# Patient Record
Sex: MALE | Race: WHITE | URBAN - METROPOLITAN AREA
[De-identification: names, ages, dates, MRNs, and addresses within clinical notes are randomized per-mention and may not be internally consistent; named-entity substitution may affect disease eponyms.]

---

## 2018-03-14 ENCOUNTER — OFFICE VISIT (OUTPATIENT)
Dept: ORTHOPEDIC SURGERY | Age: 33
End: 2018-03-14

## 2018-03-14 DIAGNOSIS — M79.605 LEG PAIN, LEFT: Primary | ICD-10-CM

## 2018-03-14 PROCEDURE — 99213 OFFICE O/P EST LOW 20 MIN: CPT | Performed by: PHYSICIAN ASSISTANT

## 2018-03-14 NOTE — PROGRESS NOTES
Subjective:       Edwar Current is a 34 y.o. male No ref. provider found   for evaluation and treatment of an injury to the left leg  . This is evaluated as a personal injury. The injury occurred 1 hour ago. Pain is localized to the posterior of their left leg. He has not injured this area in the past.    Mechanism: Pt was playing basketball and felt a pop in the achilles tendon area when he made a cut. No prior injuries. Not able to bear weight right now. Noticed some swelling. No pain in the knee. Outside reports reviewed: none. Patient's medications, allergies, past medical, surgical, social and family histories were reviewed and updated as appropriate. The pain assessment was noted & is as follows:  Pain Assessment  Location of Pain: Leg  Location Modifiers: Left  Severity of Pain: 6  Quality of Pain: Sharp  Frequency of Pain: Constant  Aggravating Factors: Bending, Exercise, Standing  Limiting Behavior: Yes  Relieving Factors: Ice  Result of Injury: Yes    Physical Exam:  Constitutional:  Pt well groomed, no acute distress, well developed, no obvious deformities  There were no vitals filed for this visit.  -Oriented to person, place, and time  -Mood and affect is appropriate    CONSTITUTIONAL:  awake, alert, cooperative, no apparent distress, and appears stated age  MUSCULOSKELETAL:  there is no redness, warmth, or swelling of the joints  full range of motion noted  motor strength is 5 out of 5 all extremities bilaterally  tone is normal  with exception of  LEFT ANKLE:  redness absent  warmth absent  swelling present  tenderness present and located distal potion of the gastroc and some into the achilles tendon. No defect noted. Sensation intact. Negative Andrew's test.  Negative Stanton test.  Able to actively dorsiflex and plantarflex foot with some pain. No deformity. range of motion limited as above.     Contralateral Exam:  -No obvious deformities  -No abrasions or cellulitis noted, NVI

## 2018-03-26 ENCOUNTER — OFFICE VISIT (OUTPATIENT)
Dept: ORTHOPEDIC SURGERY | Age: 33
End: 2018-03-26

## 2018-03-26 VITALS
HEIGHT: 71 IN | SYSTOLIC BLOOD PRESSURE: 141 MMHG | WEIGHT: 229.06 LBS | DIASTOLIC BLOOD PRESSURE: 100 MMHG | HEART RATE: 109 BPM | BODY MASS INDEX: 32.07 KG/M2

## 2018-03-26 DIAGNOSIS — M79.605 LEFT LEG PAIN: ICD-10-CM

## 2018-03-26 DIAGNOSIS — S86.112A STRAIN OF GASTROCNEMIUS MUSCLE OF LEFT LOWER EXTREMITY, INITIAL ENCOUNTER: Primary | ICD-10-CM

## 2018-03-26 PROCEDURE — MISCD114 CALF SLEEVES: Performed by: FAMILY MEDICINE

## 2018-03-26 PROCEDURE — 99214 OFFICE O/P EST MOD 30 MIN: CPT | Performed by: FAMILY MEDICINE

## 2018-03-26 RX ORDER — NAPROXEN 500 MG/1
500 TABLET ORAL 2 TIMES DAILY WITH MEALS
Qty: 60 TABLET | Refills: 3 | Status: SHIPPED | OUTPATIENT
Start: 2018-03-26

## 2018-03-26 NOTE — PROGRESS NOTES
none.    Patient's medications, allergies, past medical, surgical, social and family histories were reviewed 3/14/2018 and updated as appropriate. Review of systems reviewed in the chart and updated 3/26/2018. The pain assessment was noted & is as follows:  Pain Assessment  Location of Pain: Leg  Location Modifiers: Left  Severity of Pain: 0    Constitutional: Patient is adequately groomed with no evidence of malnutrition  DTRs: Deep tendon reflexes are intact  Mental Status: The patient is oriented to time, place and person. The patient's mood and affect are appropriate. Lymphatic: The lymphatic examination bilaterally reveals all areas to be without enlargement or induration. Vascular: Examination reveals no swelling or calf tenderness. Peripheral pulses are palpable and 2+. Physical Exam:  Constitutional:  Pt well groomed, no acute distress, well developed, no obvious deformities  Vitals:    03/26/18 0843 03/26/18 0849   BP: (!) 148/101 (!) 141/100   Pulse: 104 109   Weight: 229 lb 0.9 oz (103.9 kg)    Height: 5' 10.87\" (1.8 m)      -Oriented to person, place, and time  -Mood and affect is appropriate    CONSTITUTIONAL:  awake, alert, cooperative, no apparent distress, and appears stated age  MUSCULOSKELETAL:  there is no redness, warmth, or swelling of the joints  full range of motion noted  motor strength is 5 out of 5 all extremities bilaterally  tone is normal  with exception of  LEFT ANKLE:  redness absent  warmth absent  swelling present  He does have localized tenderness at about 5-6 over the medial head musculotendinous junction with plus degrees of pain over the distal Achilles tendon. No osseous tenderness. No defect noted. Sensation intact. Negative Andrew's test.  Negative Stanton test.  Able to actively dorsiflex and plantarflex foot with some pain. No deformity. range of motion limited as above.     Contralateral Exam:  -No obvious deformities  -No abrasions or cellulitis

## 2018-03-27 ENCOUNTER — HOSPITAL ENCOUNTER (OUTPATIENT)
Dept: PHYSICAL THERAPY | Facility: MEDICAL CENTER | Age: 33
Discharge: HOME | End: 2018-03-31
Admitting: FAMILY MEDICINE

## 2018-03-29 DIAGNOSIS — S86.112A STRAIN OF GASTROCNEMIUS MUSCLE OF LEFT LOWER EXTREMITY, INITIAL ENCOUNTER: Primary | ICD-10-CM

## 2018-03-29 DIAGNOSIS — M79.605 LEFT LEG PAIN: ICD-10-CM

## 2018-03-30 ENCOUNTER — HOSPITAL ENCOUNTER (OUTPATIENT)
Dept: PHYSICAL THERAPY | Facility: MEDICAL CENTER | Age: 33
Discharge: HOME | End: 2018-03-31
Admitting: FAMILY MEDICINE

## 2018-04-01 ENCOUNTER — HOSPITAL ENCOUNTER (OUTPATIENT)
Dept: OTHER | Age: 33
Discharge: HOME | End: 2018-04-30
Attending: FAMILY MEDICINE | Admitting: FAMILY MEDICINE

## 2018-04-03 ENCOUNTER — HOSPITAL ENCOUNTER (OUTPATIENT)
Dept: PHYSICAL THERAPY | Facility: MEDICAL CENTER | Age: 33
Discharge: HOME | End: 2018-04-04
Admitting: FAMILY MEDICINE

## 2018-04-05 NOTE — FLOWSHEET NOTE
patients Functional Deficits. - Patient will demonstrate an increase in Strength to full and painfree to resistance testing to allow for proper functional mobility as indicated by patients Functional Deficits. - Patient will return to desired, higher level,  functional activities without increased symptoms or restriction. Progression Towards Functional goals:  [x] Patient is progressing as expected towards functional goals listed. [] Progression is slowed due to complexities listed. [] Progression has been slowed due to co-morbidities.   [] Plan just implemented, too soon to assess goals progression  [] Other:     ASSESSMENT:  See eval    Treatment/Activity Tolerance:  [] Patient tolerated treatment well [] Patient limited by fatique  [] Patient limited by pain  [] Patient limited by other medical complications  [] Other:     Prognosis: [] Good [] Fair  [] Poor    Patient Requires Follow-up: [x] Yes  [] No    PLAN:   [x] Continue per plan of care [] Alter current plan (see comments)  [] Plan of care initiated [] Hold pending MD visit [] Discharge    Electronically signed by: Porfirio Shaffer PT, MPT

## 2018-04-06 ENCOUNTER — HOSPITAL ENCOUNTER (OUTPATIENT)
Dept: PHYSICAL THERAPY | Facility: MEDICAL CENTER | Age: 33
Discharge: HOME | End: 2018-04-07
Admitting: FAMILY MEDICINE

## 2018-04-10 ENCOUNTER — HOSPITAL ENCOUNTER (OUTPATIENT)
Dept: PHYSICAL THERAPY | Facility: MEDICAL CENTER | Age: 33
Discharge: HOME | End: 2018-04-11
Admitting: FAMILY MEDICINE

## 2018-04-11 NOTE — FLOWSHEET NOTE
(3 positions)   30x each     Soleus matrix - 3 positions   30x each    Leg press (single leg)   80# - 45x    lsd   6\" - 30x    sls foam  3 x 20\"    Single leg 'lean'   10 x 4 positions    biodex RC   4'     clamwalks  2 laps blue    leandra: abd, ext   45x each  15#    bosu minisquats   20x 5\"     Seated soleus pumps  70# - 20x    'Gunner - patter' box             Manual Intervention  30\" x 2                Prom/stm   10'     Dry needling manual therapy: consisted on the placement of microfilament needles in the following muscles:  medial and lateral gastroc. A 50 mm needle was inserted, piston, rotated, and coned to produce intramuscular mobilization. These techniques were used to restore functional range of motion, reduce muscle spasm and induce healing in the corresponding musculature. (48684)  Clean Technique was utilized today while applying Dry needling treatment. The treatment sites where cleaned with 70% solution of  isopropyl alcohol . The PT washed their hands and utilized treatment gloves along with hand  prior to inserting the needles. All needles where removed and discarded in the appropriate sharps container. 10'                                                                                Therapeutic Exercise and NMR EXR  [x] (69620) Provided verbal/tactile cueing for activities related to strengthening, flexibility, endurance, ROM for improvements in LE, proximal hip, and core control with self care, mobility, lifting, ambulation.  [] (28570) Provided verbal/tactile cueing for activities related to improving balance, coordination, kinesthetic sense, posture, motor skill, proprioception  to assist with LE, proximal hip, and core control in self care, mobility, lifting, ambulation and eccentric single leg control.      NMR and Therapeutic Activities:    [x] (50954 or 72302) Provided verbal/tactile cueing for activities related to improving balance, coordination, kinesthetic sense,

## 2018-04-20 ENCOUNTER — HOSPITAL ENCOUNTER (OUTPATIENT)
Dept: PHYSICAL THERAPY | Facility: MEDICAL CENTER | Age: 33
Discharge: HOME | End: 2018-04-21
Admitting: FAMILY MEDICINE

## 2018-04-21 NOTE — FLOWSHEET NOTE
passive and active ROM to full, symmetrical and painfree to allow for proper joint functioning as indicated by patients Functional Deficits. - Patient will demonstrate an increase in Strength to full and painfree to resistance testing to allow for proper functional mobility as indicated by patients Functional Deficits. - Patient will return to desired, higher level,  functional activities without increased symptoms or restriction. Progression Towards Functional goals:  [x] Patient is progressing as expected towards functional goals listed. [] Progression is slowed due to complexities listed. [] Progression has been slowed due to co-morbidities.   [] Plan just implemented, too soon to assess goals progression  [] Other:     ASSESSMENT:  See eval    Treatment/Activity Tolerance:  [] Patient tolerated treatment well [] Patient limited by fatique  [] Patient limited by pain  [] Patient limited by other medical complications  [] Other:     Prognosis: [] Good [] Fair  [] Poor    Patient Requires Follow-up: [x] Yes  [] No    PLAN:   [x] Continue per plan of care [] Alter current plan (see comments)  [] Plan of care initiated [] Hold pending MD visit [] Discharge    Electronically signed by: Darroll Dandy PT, MPT

## 2018-04-24 ENCOUNTER — HOSPITAL ENCOUNTER (OUTPATIENT)
Dept: PHYSICAL THERAPY | Facility: MEDICAL CENTER | Age: 33
Discharge: HOME | End: 2018-04-25
Admitting: FAMILY MEDICINE

## 2018-04-25 ENCOUNTER — OFFICE VISIT (OUTPATIENT)
Dept: ORTHOPEDIC SURGERY | Age: 33
End: 2018-04-25

## 2018-04-25 VITALS — WEIGHT: 229.06 LBS | BODY MASS INDEX: 32.07 KG/M2 | HEIGHT: 71 IN

## 2018-04-25 DIAGNOSIS — M79.605 LEFT LEG PAIN: Primary | ICD-10-CM

## 2018-04-25 DIAGNOSIS — S86.112D STRAIN OF GASTROCNEMIUS MUSCLE OF LEFT LOWER EXTREMITY, SUBSEQUENT ENCOUNTER: ICD-10-CM

## 2018-04-25 PROCEDURE — 99213 OFFICE O/P EST LOW 20 MIN: CPT | Performed by: FAMILY MEDICINE

## 2018-04-26 NOTE — FLOWSHEET NOTE
[facility]  Orthopaedics and Sports Rehabilitation, [facility]    Physical Therapy Daily Treatment Note  Date:  2018  Patient Name:  Leonardo Julian    :  [birth year]  MRN: [id number]  Restrictions/Precautions:    Medical/Treatment Diagnosis Information:  · Diagnosis: S86.112A (ICD-10-CM) - Strain of gastrocnemius muscle of left lower extremity, initial encounter  · Treatment Diagnosis: M79.605 (ICD-10-CM) - Left leg pain  Insurance/Certification information:  PT Insurance Information: [facility]   Physician Information:  Referring Practitioner: dr Jen Barajas of care signed (Y/N):     Date of Patient follow up with Physician:     G-Code (if applicable):      Date G-Code Applied:    PT G-Codes  Functional Assessment Tool Used: lefs  Score: 34%  Functional Limitation: Mobility: Walking and moving around  Mobility: Walking and Moving Around Current Status (): At least 20 percent but less than 40 percent impaired, limited or restricted  Mobility: Walking and Moving Around Goal Status ():  At least 1 percent but less than 20 percent impaired, limited or restricted    Progress Note: []  Yes  [x]  No  Next due by: Visit #10       Latex Allergy:  [x]NO      []YES  Preferred Language for Healthcare:   [x]English       []other:    Visit # Insurance Allowable   7 018 PT FACILITY BENEFITS ANTHEM/ / OOP 2000/ 80% 20%/ 60 COMBINED VPCY/ NO AUTH REQ/ 3/27/18 SHAKIRA REF# [id number]     Pain level:  0-2/10     SUBJECTIVE:   Op note     OBJECTIVE:   Observation: op note   Test measurements:      RESTRICTIONS/PRECAUTIONS:   Exercises/Interventions:     Therapeutic Ex Sets/sec Reps Notes   Df belt stretch/inversion/eversion   6 x 30\"/4 x 20\"/4 x 20\"     Standing calf raises - 3 positions   45x each blue t band    Standing slantboard   4 x 30\"     Standing wall minisquats   20\" x 3 Single    Step and holds   10 x 10\"  Standing weight shifts (3 positions)   30x each     Soleus matrix - 3 positions   30x each    Leg press (single leg)   80# - 45x    lsd   6\" - 30x    sls foam  3 x 20\"    Single leg 'lean'   10 x 4 positions    biodex RC   4'     clamwalks  2 laps blue    leandra: abd, ext   45x each  15#    bosu minisquats   20x 5\"     Seated soleus pumps      Retro tmill   70# - 20x    12% - 4'     'Gunner - patter' box             Manual Intervention  30\" x 2                Prom/stm   10'     Dry needling manual therapy: consisted on the placement of microfilament needles in the following muscles:  medial and lateral gastroc. A 50 mm needle was inserted, piston, rotated, and coned to produce intramuscular mobilization. These techniques were used to restore functional range of motion, reduce muscle spasm and induce healing in the corresponding musculature. (02371)  Clean Technique was utilized today while applying Dry needling treatment. The treatment sites where cleaned with 70% solution of  isopropyl alcohol . The PT washed their hands and utilized treatment gloves along with hand  prior to inserting the needles. All needles where removed and discarded in the appropriate sharps container. Therapeutic Exercise and NMR EXR  [x] (19289) Provided verbal/tactile cueing for activities related to strengthening, flexibility, endurance, ROM for improvements in LE, proximal hip, and core control with self care, mobility, lifting, ambulation.  [] (96423) Provided verbal/tactile cueing for activities related to improving balance, coordination, kinesthetic sense, posture, motor skill, proprioception  to assist with LE, proximal hip, and core control in self care, mobility, lifting, ambulation and eccentric single leg control.      NMR and Therapeutic Activities:    [x] (88776 or 91677) Provided verbal/tactile cueing for activities related to improving balance, coordination, kinesthetic sense, posture, motor skill, proprioception and motor painfree to allow for proper joint functioning as indicated by patients Functional Deficits. - Patient will demonstrate an increase in Strength to full and painfree to resistance testing to allow for proper functional mobility as indicated by patients Functional Deficits. - Patient will return to desired, higher level,  functional activities without increased symptoms or restriction. Progression Towards Functional goals:  [x] Patient is progressing as expected towards functional goals listed. [] Progression is slowed due to complexities listed. [] Progression has been slowed due to co-morbidities.   [] Plan just implemented, too soon to assess goals progression  [] Other:     ASSESSMENT:  See eval    Treatment/Activity Tolerance:  [] Patient tolerated treatment well [] Patient limited by fatique  [] Patient limited by pain  [] Patient limited by other medical complications  [] Other:     Prognosis: [] Good [] Fair  [] Poor    Patient Requires Follow-up: [x] Yes  [] No    PLAN:   [x] Continue per plan of care [] Alter current plan (see comments)  [] Plan of care initiated [] Hold pending MD visit [] Discharge    Electronically signed by: Steven Ray PT, MPT

## 2018-04-27 ENCOUNTER — HOSPITAL ENCOUNTER (OUTPATIENT)
Dept: PHYSICAL THERAPY | Facility: MEDICAL CENTER | Age: 33
Discharge: HOME | End: 2018-04-28
Admitting: FAMILY MEDICINE

## 2018-04-30 NOTE — FLOWSHEET NOTE
[facility]  Orthopaedics and Sports Rehabilitation, [facility]    Physical Therapy Daily Treatment Note  Date:  2018  Patient Name:  Tammy Mcmullen    :  [birth year]  MRN: [id number]  Restrictions/Precautions:    Medical/Treatment Diagnosis Information:  · Diagnosis: S86.112A (ICD-10-CM) - Strain of gastrocnemius muscle of left lower extremity, initial encounter  · Treatment Diagnosis: M79.605 (ICD-10-CM) - Left leg pain  Insurance/Certification information:  PT Insurance Information: [facility]   Physician Information:  Referring Practitioner: dr Ibeth Rangel of care signed (Y/N):     Date of Patient follow up with Physician:     G-Code (if applicable):      Date G-Code Applied:    PT G-Codes  Functional Assessment Tool Used: lefs  Score: 34%  Functional Limitation: Mobility: Walking and moving around  Mobility: Walking and Moving Around Current Status (): At least 20 percent but less than 40 percent impaired, limited or restricted  Mobility: Walking and Moving Around Goal Status (): At least 1 percent but less than 20 percent impaired, limited or restricted    Progress Note: []  Yes  [x]  No  Next due by: Visit #10       Latex Allergy:  [x]NO      []YES  Preferred Language for Healthcare:   [x]English       []other:    Visit # Insurance Allowable   8 018 PT FACILITY BENEFITS ANTHEM/ / OOP 2000/ 80% 20%/ 60 COMBINED VPCY/ NO AUTH REQ/ 3/27/18 SHAKIRA REF# [id number]     Pain level:  0-2/10     SUBJECTIVE:   \"doing ok. \"    OBJECTIVE:   Observation: + asymmetry with single leg cr's yet   Test measurements:      RESTRICTIONS/PRECAUTIONS:   Exercises/Interventions:     Therapeutic Ex Sets/sec Reps Notes   Df belt stretch/inversion/eversion   6 x 30\"/4 x 20\"/4 x 20\"     Standing calf raises - 3 positions   45x each blue t band    Standing slantboard   4 x 30\"     Standing wall minisquats   20\" x 3 Single    Step and holds   10 x 10\"  Standing weight shifts (3 positions)   30x each     Soleus matrix sense, posture, motor skill, proprioception and motor activation to allow for proper function of core, proximal hip and LE with self care and ADLs  [] (53962) Gait Re-education- Provided training and instruction to the patient for proper LE, core and proximal hip recruitment and positioning and eccentric body weight control with ambulation re-education including up and down stairs     Home Exercise Program:    [x] (15810) Reviewed/Progressed HEP activities related to strengthening, flexibility, endurance, ROM of core, proximal hip and LE for functional self-care, mobility, lifting and ambulation/stair navigation   [] (61820)Reviewed/Progressed HEP activities related to improving balance, coordination, kinesthetic sense, posture, motor skill, proprioception of core, proximal hip and LE for self care, mobility, lifting, and ambulation/stair navigation      Manual Treatments:  PROM / STM / Oscillations-Mobs:  G-I, II, III, IV (PA's, Inf., Post.)  [x] (73707) Provided manual therapy to mobilize LE, proximal hip and/or LS spine soft tissue/joints for the purpose of modulating pain, promoting relaxation,  increasing ROM, reducing/eliminating soft tissue swelling/inflammation/restriction, improving soft tissue extensibility and allowing for proper ROM for normal function with self care, mobility, lifting and ambulation. Modalities:    cp  Charges:  Timed Code Treatment Minutes: 39'   Total Treatment Minutes: 79'      [] EVAL  [x] XL(82205) x  3   [] IONTO  [] NMR (13417) x      [] VASO  [] Manual (77597) x       [] Other:  [] TA x       [] Mech Traction (60644)  [] ES(attended) (50293)      [] ES (un) (13314):     GOALS:   Long Term Goals: To be achieved in: 8 weeks  - The patient is expected to demonstrate less than 18% impairment, limitation or restriction in:  - walking and moving around (mobility)  -changing and maintaining body position  - carrying, moving and handling objects.    - Patient will demonstrate increased passive and active ROM to full, symmetrical and painfree to allow for proper joint functioning as indicated by patients Functional Deficits. - Patient will demonstrate an increase in Strength to full and painfree to resistance testing to allow for proper functional mobility as indicated by patients Functional Deficits. - Patient will return to desired, higher level,  functional activities without increased symptoms or restriction. Progression Towards Functional goals:  [x] Patient is progressing as expected towards functional goals listed. [] Progression is slowed due to complexities listed. [] Progression has been slowed due to co-morbidities.   [] Plan just implemented, too soon to assess goals progression  [] Other:     ASSESSMENT:  See eval    Treatment/Activity Tolerance:  [] Patient tolerated treatment well [] Patient limited by fatique  [] Patient limited by pain  [] Patient limited by other medical complications  [] Other:     Prognosis: [] Good [] Fair  [] Poor    Patient Requires Follow-up: [x] Yes  [] No    PLAN:   [x] Continue per plan of care [] Alter current plan (see comments)  [] Plan of care initiated [] Hold pending MD visit [] Discharge    Electronically signed by: Kay Rivera PT, MPT

## 2018-05-01 ENCOUNTER — HOSPITAL ENCOUNTER (OUTPATIENT)
Dept: OTHER | Age: 33
Discharge: HOME | End: 2018-05-31
Attending: FAMILY MEDICINE | Admitting: FAMILY MEDICINE

## 2018-06-01 NOTE — DISCHARGE SUMMARY
[address] [facility], [address] [facility], [address] [place] [address] [place] [address]  Phone: [phone]   Fax:     [phone]       Physical Therapy Discharge  Date: 2018        Patient Name:  Cindy Chacon    :  [birth year]  MRN: [id number]  Referring Physician: dr Dustin Villaseñor   Diagnosis:      Treatment Diagnosis: M79.605 (ICD-10-CM) - Left leg pain      [] Surgical [x] Conservative    Therapy Diagnosis/Practice Pattern:     Number of Comorbidities:  []0     []1-2    []3+   Total number of visits:  8  Reporting Period:   Beginning Date:  3/27  End Date:     OBJECTIVE  Test used Initial score Discharge Score   Pain Summary Scale  7 0   Functional questionnaire lefs  34% 8%   Functional Testing            ROM Ankle  Decreased 33%  wnl          Strength Ankle  4- 4+              Functional Limitation G-Code (if applicable):             Test/tests used to determine % limitation:  lefs   Actual Score used to drive % limitation:    Treatment to date:  [x] Therapeutic Exercise    [] Modalities:  [x] Therapeutic Activity             []Ultrasound            [x]Electrical Stimulation  [x] Gait Training     []Cervical Traction    [] Lumbar Traction  [x] Neuromuscular Re-education [] Cold/hotpack         []Iontophoresis  [x] Instruction in HEP      Other:  [x] Manual Therapy                   [x]    vasopneumatic compression                    ? []   Assessment:  [x] All Goals were achieved.   [] The following goals were achieved (#'s):  [] The following goals were not achieved for the following reasons:/assessmen of improvement as it relates to each goal:    Plan of Care:  [x] Discharge from Therapy Services due to:    Reason for Discharge:   [x] All goals achieved    [] Patient having surgery  [] Physician discontinued therapy  [] Insurance/Financial Limitations [] Patient did not return for follow up visits [] Home program/1 visit only   [] No subjective or